# Patient Record
Sex: FEMALE | Race: WHITE | NOT HISPANIC OR LATINO | URBAN - METROPOLITAN AREA
[De-identification: names, ages, dates, MRNs, and addresses within clinical notes are randomized per-mention and may not be internally consistent; named-entity substitution may affect disease eponyms.]

---

## 2017-07-03 ENCOUNTER — OFFICE (OUTPATIENT)
Dept: URBAN - METROPOLITAN AREA CLINIC 78 | Facility: CLINIC | Age: 42
End: 2017-07-03

## 2017-07-03 VITALS
SYSTOLIC BLOOD PRESSURE: 105 MMHG | HEIGHT: 65 IN | DIASTOLIC BLOOD PRESSURE: 80 MMHG | WEIGHT: 224 LBS | HEART RATE: 89 BPM | TEMPERATURE: 98.1 F

## 2017-07-03 DIAGNOSIS — K92.1 MELENA: ICD-10-CM

## 2017-07-03 DIAGNOSIS — K59.09 OTHER CONSTIPATION: ICD-10-CM

## 2017-07-03 DIAGNOSIS — E11.9 TYPE 2 DIABETES MELLITUS WITHOUT COMPLICATIONS: ICD-10-CM

## 2017-07-03 DIAGNOSIS — R19.7 DIARRHEA, UNSPECIFIED: ICD-10-CM

## 2017-07-03 PROCEDURE — 99244 OFF/OP CNSLTJ NEW/EST MOD 40: CPT

## 2017-07-03 NOTE — SERVICEHPINOTES
RUDDY PERSON   is a   42   year old    female who is being seen in consultation at the request of   JANUARY DE LOS SANTOS   for stomach issues, worse over the past 6 months. She has diabetes and has been on multiple different meds for that due to poor control. She reports bright red blood in stools. She does have h/o hemorrhoids but is not sure if this is causing her bleeding. Symptoms worse when she strains and has hard stools, which is frequent. She states that she has had this issue off and on for years. She reports change in bowel habits starting 6-12 months ago. She normally has one formed stool per day (but usually pellet-like) but approximately 1-2 times a month she will have "episodes" starting with abdominal cramping and then will pass formed stool followed by episodes of diarrhea over a period of hours. Can have low back pain and states that abdominal cramps are severe. She will have nausea and sometimes dry heaves. Usually tries some gingerale and Pepto-Bismol which seems to help. She had a colonoscopy in [place] in 2007 due to diarrhea at that time reportedly only showed hemorrhoids.

## 2017-07-31 ENCOUNTER — OFFICE (OUTPATIENT)
Dept: URBAN - METROPOLITAN AREA CLINIC 32 | Facility: CLINIC | Age: 42
End: 2017-07-31

## 2017-07-31 ENCOUNTER — OFFICE (OUTPATIENT)
Dept: URBAN - METROPOLITAN AREA CLINIC 32 | Facility: CLINIC | Age: 42
End: 2017-07-31
Payer: COMMERCIAL

## 2017-07-31 VITALS
WEIGHT: 224 LBS | DIASTOLIC BLOOD PRESSURE: 57 MMHG | OXYGEN SATURATION: 96 % | TEMPERATURE: 97.2 F | HEIGHT: 65 IN | TEMPERATURE: 98.8 F | SYSTOLIC BLOOD PRESSURE: 114 MMHG | DIASTOLIC BLOOD PRESSURE: 77 MMHG | DIASTOLIC BLOOD PRESSURE: 54 MMHG | HEART RATE: 87 BPM | SYSTOLIC BLOOD PRESSURE: 132 MMHG | SYSTOLIC BLOOD PRESSURE: 90 MMHG | RESPIRATION RATE: 16 BRPM | OXYGEN SATURATION: 95 % | HEART RATE: 72 BPM | RESPIRATION RATE: 12 BRPM

## 2017-07-31 DIAGNOSIS — R19.7 DIARRHEA, UNSPECIFIED: ICD-10-CM

## 2017-07-31 DIAGNOSIS — K92.1 MELENA: ICD-10-CM

## 2017-07-31 DIAGNOSIS — K59.09 OTHER CONSTIPATION: ICD-10-CM

## 2017-07-31 PROBLEM — K64.4 RESIDUAL HEMORRHOIDAL SKIN TAGS: Status: ACTIVE | Noted: 2017-07-31

## 2017-07-31 PROCEDURE — 00810: CPT | Mod: AA,P3,QS

## 2017-07-31 PROCEDURE — 00810: CPT | Mod: P3,QS,AA

## 2017-08-09 ENCOUNTER — OFFICE (OUTPATIENT)
Dept: URBAN - METROPOLITAN AREA CLINIC 78 | Facility: CLINIC | Age: 42
End: 2017-08-09

## 2017-08-09 VITALS
DIASTOLIC BLOOD PRESSURE: 87 MMHG | TEMPERATURE: 98 F | HEART RATE: 83 BPM | HEIGHT: 65 IN | SYSTOLIC BLOOD PRESSURE: 114 MMHG | WEIGHT: 227 LBS

## 2017-08-09 DIAGNOSIS — K64.8 OTHER HEMORRHOIDS: ICD-10-CM

## 2017-08-09 DIAGNOSIS — K59.09 OTHER CONSTIPATION: ICD-10-CM

## 2017-08-09 DIAGNOSIS — E11.9 TYPE 2 DIABETES MELLITUS WITHOUT COMPLICATIONS: ICD-10-CM

## 2017-08-09 DIAGNOSIS — R19.7 DIARRHEA, UNSPECIFIED: ICD-10-CM

## 2017-08-09 PROCEDURE — 99213 OFFICE O/P EST LOW 20 MIN: CPT

## 2017-08-09 NOTE — SERVICEHPINOTES
43 yo female presents for f/u stomach issues. Her colonoscopy showed hemorrhoids and labs were fairly unremarkable with negative celiac panel and negative O&ampP stool studies. She reports improvement in her symptoms with use of probiotics and fiber. Has been doing well overall constipation has improved and she has not had any further episodes of cramping and diarrhea. She is pleased with her progress and has no complaints today.

## 2017-09-13 PROBLEM — K92.2 EVALUATION OF UNEXPLAINED GI BLEEDING: Status: ACTIVE | Noted: 2017-07-31

## 2017-09-13 PROBLEM — R19.7 CLINICALLY SIGNIFICANT DIARRHEA OF UNEXPLAINED ORIGIN: Status: ACTIVE | Noted: 2017-07-31
